# Patient Record
Sex: MALE | Race: WHITE | URBAN - METROPOLITAN AREA
[De-identification: names, ages, dates, MRNs, and addresses within clinical notes are randomized per-mention and may not be internally consistent; named-entity substitution may affect disease eponyms.]

---

## 2020-08-04 ENCOUNTER — OFFICE VISIT (OUTPATIENT)
Dept: INTERNAL MEDICINE CLINIC | Age: 50
End: 2020-08-04

## 2020-08-04 VITALS
RESPIRATION RATE: 20 BRPM | HEART RATE: 78 BPM | DIASTOLIC BLOOD PRESSURE: 86 MMHG | HEIGHT: 64 IN | SYSTOLIC BLOOD PRESSURE: 128 MMHG | WEIGHT: 137.5 LBS | TEMPERATURE: 97.3 F | BODY MASS INDEX: 23.47 KG/M2 | OXYGEN SATURATION: 98 %

## 2020-08-04 DIAGNOSIS — Z00.00 ROUTINE GENERAL MEDICAL EXAMINATION AT A HEALTH CARE FACILITY: Primary | ICD-10-CM

## 2020-08-04 DIAGNOSIS — Z12.11 SCREEN FOR COLON CANCER: ICD-10-CM

## 2020-08-04 DIAGNOSIS — Z12.5 SCREENING FOR PROSTATE CANCER: ICD-10-CM

## 2020-08-04 DIAGNOSIS — M79.89 MASS OF SOFT TISSUE OF UPPER ARM: ICD-10-CM

## 2020-08-04 DIAGNOSIS — Z23 ENCOUNTER FOR IMMUNIZATION: ICD-10-CM

## 2020-08-04 PROCEDURE — 90715 TDAP VACCINE 7 YRS/> IM: CPT | Performed by: INTERNAL MEDICINE

## 2020-08-04 PROCEDURE — 99386 PREV VISIT NEW AGE 40-64: CPT | Performed by: INTERNAL MEDICINE

## 2020-08-04 PROCEDURE — 90471 IMMUNIZATION ADMIN: CPT | Performed by: INTERNAL MEDICINE

## 2020-08-04 NOTE — PROGRESS NOTES
HISTORY OF PRESENT ILLNESS  Jameson Gong is a 47 y.o. male. HPI Subjective:  Samuel Metro is seen today for a reestablishment visit and to have a complete exam.  He has not been to the office for several years, (greater than three). 1. Preventive medicine. He is due for labs, colonoscopy, TDAP booster and the shingles vaccine. 2. New problem reviewed. He has a subcutaneous growth on his right upper extremity. He has noticed it for about the past four months. He does not feel it has changed in size and it is non tender. Exam is mainly consistent with a lipoma, but the consistency is a bit firmer than I find typical for a lipoma. We will check x-rays of the arm and likely an MRI. Social History:  Notable for him getting out of the [employer] field in 2018. He now works as a  and has been doing fairly well, despite the stress of recent financial turbulence. Family History:  Notable for a brother having prostate cancer. We counseled regarding healthy lifestyle issues including diet, exercise and stress management. Family history, social history, etc. Are reviewed and updated, see electronic record. No past medical history on file. Past Surgical History:   Procedure Laterality Date    CYSTOSCOPY,DIL URETHRAL STRICTURE  age 5     No current outpatient medications on file. No current facility-administered medications for this visit. No Known Allergies  Family History   Problem Relation Age of Onset    Dementia Mother    Jon Hoose Stroke Mother     Depression Mother     Schizophrenia Mother     Lung Disease Father     Cancer Father         lung ca    Heart Disease Brother         heart attack at age of 54         Review of Systems   Constitutional: Negative for weight loss. Respiratory: Negative. Cardiovascular: Negative for chest pain, palpitations, leg swelling and PND. Gastrointestinal: Negative for blood in stool, constipation, diarrhea and melena. Genitourinary: Negative. Musculoskeletal: Negative for myalgias. Neurological: Negative for focal weakness. Physical Exam  Vitals signs and nursing note reviewed. Constitutional:       General: He is not in acute distress. Appearance: He is well-developed. HENT:      Head: Normocephalic and atraumatic. Right Ear: Tympanic membrane, ear canal and external ear normal.      Left Ear: Tympanic membrane, ear canal and external ear normal.   Eyes:      General:         Right eye: No discharge. Left eye: No discharge. Pupils: Pupils are equal, round, and reactive to light. Neck:      Musculoskeletal: Normal range of motion and neck supple. Thyroid: No thyromegaly. Vascular: No carotid bruit. Cardiovascular:      Rate and Rhythm: Normal rate and regular rhythm. Heart sounds: Normal heart sounds. No murmur. No friction rub. No gallop. Pulmonary:      Effort: Pulmonary effort is normal. No respiratory distress. Breath sounds: Normal breath sounds. No wheezing or rales. Abdominal:      General: Bowel sounds are normal. There is no distension. Palpations: Abdomen is soft. There is no mass. Tenderness: There is no abdominal tenderness. There is no guarding or rebound. Genitourinary:     Prostate: Normal. Not enlarged. Rectum: Normal.   Musculoskeletal: Normal range of motion. General: No tenderness. Arms:    Lymphadenopathy:      Cervical: No cervical adenopathy. Skin:     General: Skin is warm and dry. Findings: No rash. Neurological:      Mental Status: He is alert and oriented to person, place, and time. Deep Tendon Reflexes: Reflexes are normal and symmetric. Psychiatric:         Behavior: Behavior normal.         ASSESSMENT and PLAN  Diagnoses and all orders for this visit:    1.  Routine general medical examination at a health care facility  -     CBC WITH AUTOMATED DIFF  -     METABOLIC PANEL, COMPREHENSIVE  -     LIPID PANEL  -     TSH 3RD GENERATION  -     URINALYSIS W/ RFLX MICROSCOPIC    2. Screening for prostate cancer  -     PSA SCREENING (SCREENING)    3. Encounter for immunization  -     TETANUS, DIPHTHERIA TOXOIDS AND ACELLULAR PERTUSSIS VACCINE (TDAP), IN INDIVIDS. >=7, IM  -     SD IMMUNIZ ADMIN,1 SINGLE/COMB VAC/TOXOID    4.  Screen for colon cancer  -     REFERRAL TO GASTROENTEROLOGY    5. Mass of soft tissue of upper arm  -     XR HUMERUS RT; Future

## 2020-08-08 DIAGNOSIS — R22.31 ARM MASS, RIGHT: Primary | ICD-10-CM

## 2020-08-15 DIAGNOSIS — R22.31 ARM MASS, RIGHT: ICD-10-CM
